# Patient Record
Sex: FEMALE | URBAN - METROPOLITAN AREA
[De-identification: names, ages, dates, MRNs, and addresses within clinical notes are randomized per-mention and may not be internally consistent; named-entity substitution may affect disease eponyms.]

---

## 2023-04-17 ENCOUNTER — TELEPHONE (OUTPATIENT)
Dept: OBSTETRICS AND GYNECOLOGY | Facility: CLINIC | Age: 43
End: 2023-04-17

## 2023-04-17 NOTE — TELEPHONE ENCOUNTER
----- Message from Keke Gallardo sent at 4/17/2023 10:34 AM CDT -----  Contact: Beatrice Barron from [facility] is calling regarding several faxes she's sent over for patient, trying to get her scheduled. Please call back [phone]

## 2023-04-17 NOTE — TELEPHONE ENCOUNTER
----- Message from Keke Gallardo sent at 4/17/2023 10:34 AM CDT -----  Contact: Beatrice Barron from [facility] is calling regarding several faxes she's sent over for patient, trying to get her scheduled. Please call back [phone]